# Patient Record
Sex: MALE | Race: WHITE | NOT HISPANIC OR LATINO | Employment: OTHER | URBAN - METROPOLITAN AREA
[De-identification: names, ages, dates, MRNs, and addresses within clinical notes are randomized per-mention and may not be internally consistent; named-entity substitution may affect disease eponyms.]

---

## 2017-04-17 ENCOUNTER — OFFICE VISIT (OUTPATIENT)
Dept: OPHTHALMOLOGY | Facility: CLINIC | Age: 67
End: 2017-04-17
Payer: MEDICARE

## 2017-04-17 DIAGNOSIS — H00.029 MEIBOMITIS, UNSPECIFIED LATERALITY: Primary | ICD-10-CM

## 2017-04-17 PROCEDURE — 92002 INTRM OPH EXAM NEW PATIENT: CPT | Performed by: OPHTHALMOLOGY

## 2017-04-17 RX ORDER — HYDROCHLOROTHIAZIDE 12.5 MG/1
12.5 CAPSULE ORAL DAILY
COMMUNITY

## 2017-04-17 ASSESSMENT — VISUAL ACUITY
CORRECTION_TYPE: CONTACTS
OD_CC+: -2
OD_CC: 20/20
OS_PH_CC: 25-1
OS_CC+: -2
METHOD: SNELLEN - LINEAR
OS_CC: 20/30

## 2017-04-17 NOTE — MR AVS SNAPSHOT
"              After Visit Summary   4/17/2017    Rodrigo Dejesus    MRN: [id number]           Patient Information     Date Of Birth          1950        Visit Information        Provider Department      4/17/2017 1:15 PM Chandler Michelle MD [facility]        Today's Diagnoses     Meibomitis, unspecified laterality    -  1      Care Instructions    Advised to not wear your contacts thru the night  Use Preservative Free artificial tears frequently  Warm packs twice daily   Return visit as necessary    Chandler Michelle M.D.  [phone]        Follow-ups after your visit        Who to contact     If you have questions or need follow up information about today's clinic visit or your schedule please contact [facility] directly at [phone].  Normal or non-critical lab and imaging results will be communicated to you by MyChart, letter or phone within 4 business days after the clinic has received the results. If you do not hear from us within 7 days, please contact the clinic through MyChart or phone. If you have a critical or abnormal lab result, we will notify you by phone as soon as possible.  Submit refill requests through [employer] or call your pharmacy and they will forward the refill request to us. Please allow 3 business days for your refill to be completed.          Additional Information About Your Visit        MyChart Information     [employer] lets you send messages to your doctor, view your test results, renew your prescriptions, schedule appointments and more. To sign up, go to [email or web address] . Click on \"Log in\" on the left side of the screen, which will take you to the Welcome page. Then click on \"Sign up Now\" on the right side of the page.     You will be asked to enter the access code listed below, as well as some personal information. Please follow the directions to create your username and password.     Your access code is: [email or web address]  Expires: 7/16/2017  2:21 " PM     Your access code will  in 90 days. If you need help or a new code, please call your [facility] clinic or [phone].        Care EveryWhere ID     This is your Care EveryWhere ID. This could be used by other organizations to access your [facility] medical records  [id number]         Blood Pressure from Last 3 Encounters:   No data found for BP    Weight from Last 3 Encounters:   No data found for Wt              Today, you had the following     No orders found for display       Primary Care Provider Office Phone # Fax #    [facility] [phone]       [address] [place] [address], [address]  [facility] [address]        Thank you!     Thank you for choosing [facility]  for your care. Our goal is always to provide you with excellent care. Hearing back from our patients is one way we can continue to improve our services. Please take a few minutes to complete the written survey that you may receive in the mail after your visit with us. Thank you!             Your Updated Medication List - Protect others around you: Learn how to safely use, store and throw away your medicines at www.disposemymeds.org.          This list is accurate as of: 17  2:21 PM.  Always use your most recent med list.                   Brand Name Dispense Instructions for use    ATENOLOL PO          hydrochlorothiazide 12.5 MG capsule    MICROZIDE     Take 12.5 mg by mouth daily

## 2017-04-17 NOTE — PATIENT INSTRUCTIONS
Advised to not wear your contacts thru the night  Use Preservative Free artificial tears frequently  Warm packs twice daily   Return visit as necessary    Chandler Michelle M.D.  [phone]

## 2017-04-17 NOTE — PROGRESS NOTES
Current Eye Medications:  systane both eyes 4 -5 times a day.  He has tried Zylet drops (they are ) and felt these helped his comfort - this was given to him about 4 or 5 years ago by a DrChasity in [place] for similar symptoms he was experiencing in his right eye.  He has some information from his DrChasity In [place],but it is in his car.       Subjective:  Patient had an exam at [facility] 7 days ago to get new contact lenses and was told that his right eye was dry and and the oil glands were blocked.  He was directed to use hot packs which he has been doing at night along with squeezing his lower eyelids with his finger tips.  Both eyes have felt more comfortable for the past couple days.  He would like Dr. Michelle to evaluation his oil glands of his eyelids and see if they are open.    Extended wear soft contact lens for 2-3 days/nights at a time.    No history of eye injuries or surgery.       Objective:  See Ophthalmology Exam.       Assessment:  Foreign body sensation right eye of indeterminate etiology.      Plan: Advised to not wear your contacts thru the night  Use Preservative Free artificial tears frequently  Warm packs twice daily   Discussed possible punctal plug.  Return visit as necessary    Chandler Michelle M.D.  [phone]

## 2017-04-19 PROBLEM — H00.029 MEIBOMITIS, UNSPECIFIED LATERALITY: Status: ACTIVE | Noted: 2017-04-19

## 2020-03-03 ENCOUNTER — OFFICE VISIT (OUTPATIENT)
Dept: OPHTHALMOLOGY | Facility: CLINIC | Age: 70
End: 2020-03-03
Payer: COMMERCIAL

## 2020-03-03 DIAGNOSIS — H02.824 CYST OF LEFT UPPER EYELID: ICD-10-CM

## 2020-03-03 DIAGNOSIS — H10.31 ACUTE CONJUNCTIVITIS OF RIGHT EYE, UNSPECIFIED ACUTE CONJUNCTIVITIS TYPE: Primary | ICD-10-CM

## 2020-03-03 DIAGNOSIS — H04.123 DRY EYES: ICD-10-CM

## 2020-03-03 DIAGNOSIS — H00.029 MEIBOMITIS, UNSPECIFIED LATERALITY: ICD-10-CM

## 2020-03-03 PROBLEM — I10 HYPERTENSION: Status: ACTIVE | Noted: 2020-03-03

## 2020-03-03 PROBLEM — K70.10 CHRONIC ALCOHOLIC HEPATITIS (H): Status: ACTIVE | Noted: 2018-08-16

## 2020-03-03 PROBLEM — M54.12 CERVICAL RADICULOPATHY: Status: ACTIVE | Noted: 2019-03-08

## 2020-03-03 PROBLEM — K63.5 COLON POLYP: Status: ACTIVE | Noted: 2019-03-08

## 2020-03-03 PROBLEM — R76.8 POSITIVE ANA (ANTINUCLEAR ANTIBODY): Status: ACTIVE | Noted: 2019-03-08

## 2020-03-03 PROBLEM — G62.9 PERIPHERAL POLYNEUROPATHY: Status: ACTIVE | Noted: 2019-03-08

## 2020-03-03 PROBLEM — G56.22 ULNAR NERVE COMPRESSION, LEFT: Status: ACTIVE | Noted: 2019-03-08

## 2020-03-03 PROBLEM — N40.0 BPH WITHOUT URINARY OBSTRUCTION: Status: ACTIVE | Noted: 2019-03-08

## 2020-03-03 PROBLEM — G56.20 LESION OF ULNAR NERVE: Status: ACTIVE | Noted: 2018-08-16

## 2020-03-03 PROBLEM — R00.1 BRADYCARDIA: Status: ACTIVE | Noted: 2019-03-08

## 2020-03-03 PROBLEM — F10.21 ALCOHOLISM IN REMISSION (H): Status: ACTIVE | Noted: 2018-08-16

## 2020-03-03 PROBLEM — M50.30 DEGENERATIVE DISC DISEASE, CERVICAL: Status: ACTIVE | Noted: 2018-08-16

## 2020-03-03 PROBLEM — M51.369 DEGENERATIVE DISC DISEASE, LUMBAR: Status: ACTIVE | Noted: 2018-08-16

## 2020-03-03 PROBLEM — N32.89 BLADDER WALL THICKENING: Status: ACTIVE | Noted: 2019-03-08

## 2020-03-03 PROBLEM — M19.011 PRIMARY OSTEOARTHRITIS, RIGHT SHOULDER: Status: ACTIVE | Noted: 2018-08-16

## 2020-03-03 PROBLEM — I10 BENIGN ESSENTIAL HYPERTENSION: Status: ACTIVE | Noted: 2018-08-16

## 2020-03-03 PROBLEM — E78.5 DYSLIPIDEMIA: Status: ACTIVE | Noted: 2018-05-01

## 2020-03-03 PROBLEM — M24.111 DEGENERATIVE TEAR OF GLENOID LABRUM OF RIGHT SHOULDER: Status: ACTIVE | Noted: 2018-08-16

## 2020-03-03 PROBLEM — K57.30 DIVERTICULOSIS OF COLON: Status: ACTIVE | Noted: 2018-08-16

## 2020-03-03 PROBLEM — M65.30 TRIGGER FINGER, ACQUIRED: Status: ACTIVE | Noted: 2019-03-08

## 2020-03-03 PROBLEM — F41.9 ANXIETY: Status: ACTIVE | Noted: 2018-04-27

## 2020-03-03 PROBLEM — M47.9 DEGENERATIVE JOINT DISEASE OF LOW BACK: Status: ACTIVE | Noted: 2018-08-16

## 2020-03-03 PROCEDURE — 92012 INTRM OPH EXAM EST PATIENT: CPT | Mod: 25 | Performed by: OPHTHALMOLOGY

## 2020-03-03 PROCEDURE — 68020 INCISE/DRAIN EYELID LINING: CPT | Mod: E1 | Performed by: OPHTHALMOLOGY

## 2020-03-03 RX ORDER — CLONAZEPAM 0.5 MG/1
0.5 TABLET ORAL DAILY
COMMUNITY
Start: 2019-10-03

## 2020-03-03 RX ORDER — TOBRAMYCIN AND DEXAMETHASONE 3; 1 MG/ML; MG/ML
1 SUSPENSION/ DROPS OPHTHALMIC 3 TIMES DAILY
Qty: 1 BOTTLE | Refills: 0 | Status: SHIPPED | OUTPATIENT
Start: 2020-03-03 | End: 2022-02-01

## 2020-03-03 RX ORDER — SOFT LENS ADJUNCTIVE SOLUTIONS
1 DROPS OPHTHALMIC (EYE) 3 TIMES DAILY PRN
COMMUNITY

## 2020-03-03 ASSESSMENT — TONOMETRY
OS_IOP_MMHG: 19
IOP_METHOD: ICARE
OD_IOP_MMHG: 21

## 2020-03-03 ASSESSMENT — VISUAL ACUITY
OS_CC+: -1
OS_CC: 20/30
OD_CC+: -1
CORRECTION_TYPE: CONTACTS
OD_CC: 20/30
METHOD: SNELLEN - LINEAR

## 2020-03-03 ASSESSMENT — EXTERNAL EXAM - RIGHT EYE: OD_EXAM: NORMAL

## 2020-03-03 ASSESSMENT — EXTERNAL EXAM - LEFT EYE: OS_EXAM: NORMAL

## 2020-03-03 NOTE — PROGRESS NOTES
Current Eye Medications:  Puremoist rewetting drops three times a day as needed both eyes. Warm compresses twice a day for last 3 days.     Subjective:  Itching and burning right eye > left eye for last week. Vision has been blurry right eye in distance. Eyes get dry sometimes.  Patient stopped drinking alcohol about a month ago. Patient uses monthly soft contacts, uses for about 1 week straight. Doesn't take out at night. Right eye feels like something is in it, aching.  Patient would like a copy of the visit today, Has a [place] CD.    Uses Night and Day contact lenses.      Objective:  See Ophthalmology Exam.       Assessment:  Possible contact lens related irritation right eye.  Cyst left upper eyelid.      Plan:  Start Tobradex right eye three times daily.  Cyst left upper eyelid incised with 18 ga needle; clear fluid, drained flat.   Recommend removing contact lenses every night before bed.  Use artificial tears up to 4 times daily both eyes as needed.  (Refresh Tears, Systane Ultra/Balance, or Theratears)  Could try using a gel/ointment at bedtime for additional comfort (Celluvisc, Refresh PM, Genteal Gel, etc)  Lid hygiene discussed and encouraged for both eyes a few times daily as needed.   Continue using warm packs on the eyes for about 10 minutes a few times daily.  Return Visit if symptoms worsen or fail to improve.     Chandler Michelle M.D.  [phone]

## 2020-03-03 NOTE — PATIENT INSTRUCTIONS
Start Tobradex right eye three times daily.  Cyst left upper eyelid incised with 18 ga needle; clear fluid, drained flat.   Recommend removing contact lenses every night before bed.  Use artificial tears up to 4 times daily both eyes as needed.  (Refresh Tears, Systane Ultra/Balance, or Theratears)  Could try using a gel/ointment at bedtime for additional comfort (Celluvisc, Refresh PM, Genteal Gel, etc)  Lid hygiene discussed and encouraged for both eyes a few times daily as needed.   Continue using warm packs on the eyes for about 10 minutes a few times daily.  Return Visit if symptoms worsen or fail to improve.     Chandler Michelle M.D.  [phone]

## 2020-03-03 NOTE — LETTER
3/3/2020         RE: Rodrigo Dejesus  [address]  [facility] [address]        Dear Colleague,    Thank you for referring your patient, Rodrigo Dejesus, to the [facility]. Please see a copy of my visit note below.     Current Eye Medications:  Puremoist rewetting drops three times a day as needed both eyes. Warm compresses twice a day for last 3 days.     Subjective:  Itching and burning right eye > left eye for last week. Vision has been blurry right eye in distance. Eyes get dry sometimes.  Patient stopped drinking alcohol about a month ago. Patient uses monthly soft contacts, uses for about 1 week straight. Doesn't take out at night. Right eye feels like something is in it, aching.  Patient would like a copy of the visit today, Has a [place] CDL.      Objective:  See Ophthalmology Exam.       Assessment:  Possible contact lens related irritation right eye.  Cyst left upper eyelid.      Plan:  Start Tobradex right eye three times daily.  Cyst left upper eyelid incised with 18 ga needle; clear fluid, drained flat.   Recommend removing contact lenses every night before bed.  Use artificial tears up to 4 times daily both eyes as needed.  (Refresh Tears, Systane Ultra/Balance, or Theratears)  Could try using a gel/ointment at bedtime for additional comfort (Celluvisc, Refresh PM, Genteal Gel, etc)  Lid hygiene discussed and encouraged for both eyes a few times daily as needed.   Continue using warm packs on the eyes for about 10 minutes a few times daily.  Return Visit if symptoms worsen or fail to improve.     Chandler Michelle M.D.  [phone]           Again, thank you for allowing me to participate in the care of your patient.        Sincerely,        Chandler Michelle MD

## 2020-03-12 ENCOUNTER — TELEPHONE (OUTPATIENT)
Dept: OPHTHALMOLOGY | Facility: CLINIC | Age: 70
End: 2020-03-12

## 2020-03-12 NOTE — TELEPHONE ENCOUNTER
Right eye continues to be bothersome - cloudy, burning, and itching, especially later in the day.  He is wearing his contact lens, and using Tobradex twice a day or three times a day (when his contact lens is out).  He just put a new contact lens in this morning, but already feels it is getting cloudy.  He wonders I he should be continuing the drops, or how he should proceed.   Explained to him that Dr. Michelle is in surgery today.  I will review this message with Dr. Michelle tomorrow, and call him back with Dr. Michelle's recommendations.

## 2020-03-13 NOTE — TELEPHONE ENCOUNTER
Dr. Michelle recommends:  No contact lens wear for one week.  Continue Tobradex three times a day.  Return in one week if no improvement.    Called patient and informed him of the new recommendations.

## 2022-02-01 ENCOUNTER — OFFICE VISIT (OUTPATIENT)
Dept: OPHTHALMOLOGY | Facility: CLINIC | Age: 72
End: 2022-02-01
Payer: MEDICARE

## 2022-02-01 DIAGNOSIS — H25.813 COMBINED FORMS OF AGE-RELATED CATARACT OF BOTH EYES: ICD-10-CM

## 2022-02-01 DIAGNOSIS — H35.3131 EARLY DRY STAGE NONEXUDATIVE AGE-RELATED MACULAR DEGENERATION OF BOTH EYES: Primary | ICD-10-CM

## 2022-02-01 DIAGNOSIS — H00.029 MEIBOMITIS, UNSPECIFIED LATERALITY: ICD-10-CM

## 2022-02-01 PROCEDURE — 92012 INTRM OPH EXAM EST PATIENT: CPT | Performed by: OPHTHALMOLOGY

## 2022-02-01 RX ORDER — TOBRAMYCIN AND DEXAMETHASONE 3; 1 MG/ML; MG/ML
1 SUSPENSION/ DROPS OPHTHALMIC DAILY PRN
Qty: 2.5 ML | Refills: 0 | Status: SHIPPED | OUTPATIENT
Start: 2022-02-01 | End: 2022-04-12

## 2022-02-01 ASSESSMENT — TONOMETRY
IOP_METHOD: APPLANATION
OS_IOP_MMHG: 14
OD_IOP_MMHG: 13

## 2022-02-01 ASSESSMENT — VISUAL ACUITY
METHOD: SNELLEN - LINEAR
CORRECTION_TYPE: CONTACTS
OS_PH_CC: 20/30
OD_CC: 20/40
OS_CC: 20/40
OS_PH_CC+: -2

## 2022-02-01 ASSESSMENT — CUP TO DISC RATIO
OS_RATIO: 0.0
OD_RATIO: 0.1

## 2022-02-01 ASSESSMENT — EXTERNAL EXAM - RIGHT EYE: OD_EXAM: NORMAL

## 2022-02-01 ASSESSMENT — EXTERNAL EXAM - LEFT EYE: OS_EXAM: NORMAL

## 2022-02-01 NOTE — PATIENT INSTRUCTIONS
"Continue same medication  May use artificial tears up to 4 times daily both eyes. (Refresh Tears, Systane Ultra/Balance, or Theratears)  Possible posterior vitreous detachment (sudden onset large floater and/or flashing lights) both eyes discussed.   Take a multiple vitamin or \"eye vitamin\" daily (AREDS2).  Protect your eyes outdoors from ultraviolet rays with sunglasses and/or brimmed hat.  Have spinach (cooked or raw), colorful fruits, walnuts, hazelnuts, almonds in your diet.  Monitor the vision in each eye weekly - call if any sudden persistent changes.  Return visit in 6 months for a complete exam and a retinal OCT.    Chandler Michelle M.D.  [phone]   "

## 2022-02-01 NOTE — PROGRESS NOTES
" Current Eye Medications:  Tobradex as needed both eyes. Opti-free twice a day both eyes.      Subjective: Blurred central vision (black dot) right eye for last 3 weeks. Once in a while notices black dot in left eye also, not as large. Vision is doing pretty well left eye. Not having any light flashes, or curtain effect either eye. Has a few floaters unchnaged for last year, thinks it is left eye. Patient uses soft contacts monthly night and day. No eye pain in either eye.      Objective:  See Ophthalmology Exam.       Assessment:  Blurred central vision right eye > left eye from mild cataracts and probable foveolar RPED right eye > left eye.      ICD-10-CM    1. Early dry stage nonexudative age-related macular degeneration of both eyes  H35.3131    2. Combined forms of age-related cataract, mild, of both eyes  H25.813    3. Meibomitis, unspecified laterality  H00.029         Plan:  Use Tobradex only sparingly.  May use artificial tears up to 4 times daily both eyes. (Refresh Tears, Systane Ultra/Balance, or Theratears)  Possible posterior vitreous detachment (sudden onset large floater and/or flashing lights) both eyes discussed.   Take a multiple vitamin or \"eye vitamin\" daily (AREDS2).  Protect your eyes outdoors from ultraviolet rays with sunglasses and/or brimmed hat.  Have spinach (cooked or raw), colorful fruits, walnuts, hazelnuts, almonds in your diet.  Monitor the vision in each eye weekly - call if any sudden persistent changes.  Return visit in 6 months for a complete exam and a retinal OCT.    Chandler Michelle M.D.  [phone]          "

## 2022-02-01 NOTE — LETTER
"    2/1/2022         RE: Rodrigo Dejesus  [address]  [facility] [address]        Dear Colleague,    Thank you for referring your patient, Rodrigo Dejesus, to the [facility]. Please see a copy of my visit note below.     Current Eye Medications:  Tobradex as needed both eyes. Opti-free twice a day both eyes.      Subjective: Blurred central vision (black dot) right eye for last 3 weeks. Once in a while notices black dot in left eye also, not as large. Vision is doing pretty well left eye. Not having any light flashes, or curtain effect either eye. Has a few floaters unchnaged for last year, thinks it is left eye. Patient uses soft contacts monthly night and day. No eye pain in either eye.      Objective:  See Ophthalmology Exam.       Assessment:  Blurred central vision right eye > left eye from mild cataracts and probable foveolar RPED right eye > left eye.      ICD-10-CM    1. Early dry stage nonexudative age-related macular degeneration of both eyes  H35.3131    2. Combined forms of age-related cataract, mild, of both eyes  H25.813    3. Meibomitis, unspecified laterality  H00.029         Plan:  Use Tobradex only sparingly.  May use artificial tears up to 4 times daily both eyes. (Refresh Tears, Systane Ultra/Balance, or Theratears)  Possible posterior vitreous detachment (sudden onset large floater and/or flashing lights) both eyes discussed.   Take a multiple vitamin or \"eye vitamin\" daily (AREDS2).  Protect your eyes outdoors from ultraviolet rays with sunglasses and/or brimmed hat.  Have spinach (cooked or raw), colorful fruits, walnuts, hazelnuts, almonds in your diet.  Monitor the vision in each eye weekly - call if any sudden persistent changes.  Return visit in 6 months for a complete exam and a retinal OCT.    Chandler Michelle M.D.  [phone]              Again, thank you for allowing me to participate in the care of your patient.        Sincerely,        Chandler Michelle, " MD

## 2022-02-07 ENCOUNTER — TELEPHONE (OUTPATIENT)
Dept: OPHTHALMOLOGY | Facility: CLINIC | Age: 72
End: 2022-02-07
Payer: MEDICARE

## 2022-02-07 NOTE — TELEPHONE ENCOUNTER
Spoke with Rodrigo. Told him to try taking 1 tablet instead of 2 and see if that helps to decrease nausea. Also recommended that he could try taking 1 in the morning after breakfast and 1 after dinner.

## 2022-02-07 NOTE — TELEPHONE ENCOUNTER
Patient indicates medication, Preservision, it makes the patient nauseous. Please contact patient to discuss options available. Phone: [phone].

## 2022-02-28 ENCOUNTER — TELEPHONE (OUTPATIENT)
Dept: OPHTHALMOLOGY | Facility: CLINIC | Age: 72
End: 2022-02-28
Payer: MEDICARE

## 2022-02-28 NOTE — TELEPHONE ENCOUNTER
Patient called stating that his vision has changed. Patient states that his right eye become blurry when he looks at farther distances. Patient would like a call back to discuss this as soon as possible at: [phone]. Patient states that it's okay to leave a voicemail if necessary.    Thank You,  Kentrell Sexton  Patient Rep

## 2022-02-28 NOTE — TELEPHONE ENCOUNTER
Spoke with patient. Vision is intermittently blurry in the distance. Wears contacts daily. I told pt to go a couple days without contacts and to use artificial tears a couple times a day and see if that helps. I set up appointment for patient with Dr. Michelle 03/08/2022 incase symptoms do not improve.

## 2022-03-08 ENCOUNTER — OFFICE VISIT (OUTPATIENT)
Dept: OPHTHALMOLOGY | Facility: CLINIC | Age: 72
End: 2022-03-08
Payer: MEDICARE

## 2022-03-08 DIAGNOSIS — H25.813 COMBINED FORMS OF AGE-RELATED CATARACT OF BOTH EYES: Primary | ICD-10-CM

## 2022-03-08 DIAGNOSIS — H35.3131 EARLY DRY STAGE NONEXUDATIVE AGE-RELATED MACULAR DEGENERATION OF BOTH EYES: ICD-10-CM

## 2022-03-08 PROCEDURE — 92012 INTRM OPH EXAM EST PATIENT: CPT | Performed by: OPHTHALMOLOGY

## 2022-03-08 PROCEDURE — 92134 CPTRZ OPH DX IMG PST SGM RTA: CPT | Performed by: OPHTHALMOLOGY

## 2022-03-08 ASSESSMENT — REFRACTION_WEARINGRX
OS_AXIS: 137
OS_CYLINDER: +0.25
OD_CYLINDER: SPHERE
OS_SPHERE: +3.75
OD_SPHERE: +3.75

## 2022-03-08 ASSESSMENT — VISUAL ACUITY
OS_PH_CC: 20/30
CORRECTION_TYPE: CONTACTS
OD_CC+: -1
OS_CC: 20/50
OD_BAT_LOW: 20/30-1
OD_CC: 20/30
OS_PH_CC+: -2
OD_BAT_MED: 20/40
OD_CC: 20/30
OS_CC+: -1
OS_BAT_LOW: 20/40
CORRECTION_TYPE: GLASSES
OD_CC+: -1
METHOD: SNELLEN - LINEAR
OD_BAT_HIGH: 20/100
OS_CC: 20/50
OS_BAT_HIGH: 20/80
METHOD: SNELLEN - LINEAR
OS_BAT_MED: 20/50

## 2022-03-08 ASSESSMENT — REFRACTION_MANIFEST
OD_CYLINDER: SPHERE
OD_SPHERE: +3.25
OS_CYLINDER: +0.75
OS_AXIS: 090
OS_SPHERE: +3.75
OD_ADD: +2.50
OS_ADD: +2.50

## 2022-03-08 ASSESSMENT — TONOMETRY
IOP_METHOD: APPLANATION
OS_IOP_MMHG: 15
OD_IOP_MMHG: 15

## 2022-03-08 ASSESSMENT — EXTERNAL EXAM - RIGHT EYE: OD_EXAM: NORMAL

## 2022-03-08 ASSESSMENT — EXTERNAL EXAM - LEFT EYE: OS_EXAM: NORMAL

## 2022-03-08 NOTE — PATIENT INSTRUCTIONS
Don't sleep in the contacts.  Stop Tobradex drop.  Use Refresh Plus drop or the drops you have every couple hours.  Return visit one month for an refraction and retinal OCT.  Chandler Michelle M.D.  [phone]

## 2022-03-08 NOTE — PROGRESS NOTES
Current Eye Medications:  Tobradex 2-3 times daily right eye.  Rewetting drops 3-5 times daily. ARED 2 daily and a lutein 20 mg daily      Subjective:  Blurred vision and light sensitivity right eye slowly getting worse over last 3 weeks. Vision is OK left eye. Towards evening right eye is sensitive to touch, because straining so hard to focus. Not having any floaters or flashes.     States vision left eye has always been poorer; right eye has been dominant and clearer.  States the central dark images that he experienced last month have resolved.     Objective:  See Ophthalmology Exam.       Assessment:  Apparent resolution of recent foveolar pathology on retinal OCT.  Cataract right eye likely responsible for blurred distance vision.      Plan:  Don't sleep in the contacts.  Stop Tobradex drop.  Use Refresh Plus drop or the drops you have every couple hours.  Return visit one month for an refraction and retinal OCT.  Chandler Michelle M.D.  [phone]

## 2022-03-08 NOTE — LETTER
3/8/2022         RE: Rodrigo Dejesus  [address]  [facility] [address]        Dear Colleague,    Thank you for referring your patient, Rodrigo Dejesus, to the [facility]. Please see a copy of my visit note below.     Current Eye Medications:  Tobradex 2-3 times daily right eye.  Rewetting drops 3-5 times daily. ARED 2 daily and a lutein 20 mg daily      Subjective:  Blurred vision and light sensitivity right eye slowly getting worse over last 3 weeks. Vision is OK left eye. Towards evening right eye is sensitive to touch, because straining so hard to focus. Not having any floaters or flashes.     States vision left eye has always been poorer; right eye has been dominant and clearer.  States the central dark images that he experienced last month have resolved.     Objective:  See Ophthalmology Exam.       Assessment:  Apparent resolution of recent foveolar pathology on retinal OCT.  Cataract right eye likely responsible for blurred distance vision.      Plan:  Don't sleep in the contacts.  Stop Tobradex drop.  Use Refresh Plus drop or the drops you have every couple hours.  Return visit one month for an refraction and retinal OCT.  Chandler Michelle M.D.  [phone]              Again, thank you for allowing me to participate in the care of your patient.        Sincerely,        Chandler Michelle MD

## 2022-04-12 ENCOUNTER — OFFICE VISIT (OUTPATIENT)
Dept: OPHTHALMOLOGY | Facility: CLINIC | Age: 72
End: 2022-04-12
Payer: MEDICARE

## 2022-04-12 DIAGNOSIS — H52.03 HYPEROPIA OF BOTH EYES: ICD-10-CM

## 2022-04-12 DIAGNOSIS — H25.813 COMBINED FORMS OF AGE-RELATED CATARACT OF BOTH EYES: ICD-10-CM

## 2022-04-12 DIAGNOSIS — H35.3131 EARLY DRY STAGE NONEXUDATIVE AGE-RELATED MACULAR DEGENERATION OF BOTH EYES: Primary | ICD-10-CM

## 2022-04-12 PROCEDURE — 92012 INTRM OPH EXAM EST PATIENT: CPT | Performed by: OPHTHALMOLOGY

## 2022-04-12 PROCEDURE — 92134 CPTRZ OPH DX IMG PST SGM RTA: CPT | Performed by: OPHTHALMOLOGY

## 2022-04-12 PROCEDURE — 92015 DETERMINE REFRACTIVE STATE: CPT | Mod: GY | Performed by: OPHTHALMOLOGY

## 2022-04-12 ASSESSMENT — REFRACTION_WEARINGRX
OD_AXIS: 060
OD_CYLINDER: +0.25
OD_SPHERE: +3.75
OS_AXIS: 147
SPECS_TYPE: SVL
OS_SPHERE: +3.50
OS_CYLINDER: +0.50

## 2022-04-12 ASSESSMENT — VISUAL ACUITY
CORRECTION_TYPE: GLASSES
OD_CC+: +2
OS_PH_CC: 20/30
METHOD: SNELLEN - LINEAR
OS_CC: 20/40
OD_PH_CC: 20/25
OS_PH_CC+: -2
OD_CC: 20/30

## 2022-04-12 ASSESSMENT — REFRACTION_MANIFEST
OD_ADD: +2.50
OS_ADD: +2.50
OD_CYLINDER: +0.50
OD_SPHERE: +3.75
OS_SPHERE: +4.50
OS_CYLINDER: +0.25
OS_AXIS: 165
OD_AXIS: 010

## 2022-04-12 ASSESSMENT — EXTERNAL EXAM - LEFT EYE: OS_EXAM: NORMAL

## 2022-04-12 ASSESSMENT — REFRACTION_FINALRX
OD_HPRISM: 3.0
OD_HBASE: OUT

## 2022-04-12 ASSESSMENT — EXTERNAL EXAM - RIGHT EYE: OD_EXAM: NORMAL

## 2022-04-12 NOTE — PROGRESS NOTES
Current Eye Medications: lutein daily, preservision. Centrum silver.     Subjective: here for refraction and OCT retina. Wearing +4.75 in both eyes for contacts and +2.25 over the contacts. He needs to have CL fitting, it has been over 2 years. He has worn this pair of contacts for two weeks, only over night every other night. Also glasses are 18 years old.    Contact lenses are Air Optix Night and Day; 8.4; 13.8.  States no diplopia with contact lenses.      Objective:  See Ophthalmology Exam.       Assessment:  Stable retinal OCT both eyes in patient with recent transient foveal pathology of indeterminate etiology.  Refractive shift right eye.      Plan:  Glasses Rx given - optional   Will try +4.00 trial lens right eye   Call me with result of contact lens power change.  Encouraged again not to wear contacts overnight.  If okay, Call in December 2022 for an appointment in March 2023 for Complete Exam    Dr. Michelle [phone]

## 2022-04-12 NOTE — PATIENT INSTRUCTIONS
Glasses Rx given - optional   Will order contacts for right eye.  Call me with result of contact lens power change.  If okay, Call in December 2022 for an appointment in March 2023 for Complete Exam    Dr. Michelle [phone]

## 2022-04-12 NOTE — LETTER
4/12/2022         RE: Rodrigo Dejesus  [address] [place] [address]        Dear Colleague,    Thank you for referring your patient, Rodrigo Dejesus, to the [facility]. Please see a copy of my visit note below.     Current Eye Medications: lutein daily, preservision. Centrum silver.     Subjective: here for refraction and OCT retina. Wearing +4.75 in both eyes for contacts and +2.25 over the contacts. He needs to have CL fitting, it has been over 2 years. He has worn this pair of contacts for two weeks, only over night every other night. Also glasses are 18 years old.    Contact lenses are Air Optix Night and Day; 8.4; 13.8.  States no diplopia with contact lenses.      Objective:  See Ophthalmology Exam.       Assessment:  Stable retinal OCT both eyes in patient with recent transient foveal pathology of indeterminate etiology.  Refractive shift right eye.      Plan:  Glasses Rx given - optional   Will try +4.00 trial lens right eye   Call me with result of contact lens power change.  Encouraged again not to wear contacts overnight.  If okay, Call in December 2022 for an appointment in March 2023 for Complete Exam    Dr. Michelle [phone]            Again, thank you for allowing me to participate in the care of your patient.        Sincerely,        Chandler Michelle MD